# Patient Record
Sex: MALE | Race: WHITE | Employment: FULL TIME | URBAN - METROPOLITAN AREA
[De-identification: names, ages, dates, MRNs, and addresses within clinical notes are randomized per-mention and may not be internally consistent; named-entity substitution may affect disease eponyms.]

---

## 2017-03-15 ENCOUNTER — OFFICE VISIT (OUTPATIENT)
Dept: RHEUMATOLOGY | Age: 37
End: 2017-03-15

## 2017-03-15 VITALS
RESPIRATION RATE: 16 BRPM | DIASTOLIC BLOOD PRESSURE: 79 MMHG | SYSTOLIC BLOOD PRESSURE: 132 MMHG | HEIGHT: 69 IN | OXYGEN SATURATION: 97 % | HEART RATE: 102 BPM | TEMPERATURE: 97.4 F | WEIGHT: 315 LBS | BODY MASS INDEX: 46.65 KG/M2

## 2017-03-15 DIAGNOSIS — Z79.60 LONG-TERM USE OF IMMUNOSUPPRESSANT MEDICATION: ICD-10-CM

## 2017-03-15 DIAGNOSIS — M45.9 ANKYLOSING SPONDYLITIS (HCC): Primary | ICD-10-CM

## 2017-03-15 DIAGNOSIS — E66.9 OBESITY, UNSPECIFIED OBESITY SEVERITY, UNSPECIFIED OBESITY TYPE: ICD-10-CM

## 2017-03-15 DIAGNOSIS — I10 ESSENTIAL HYPERTENSION: ICD-10-CM

## 2017-03-15 DIAGNOSIS — K76.0 HEPATIC STEATOSIS: ICD-10-CM

## 2017-03-15 NOTE — MR AVS SNAPSHOT
Visit Information Date & Time Provider Department Dept. Phone Encounter #  
 3/15/2017 11:30 AM Jigna Rodriguez MD [facility] [id number] Follow-up Instructions Return in about 4 months (around 7/15/2017). Upcoming Health Maintenance Date Due DTaP/Tdap/Td series (1 - Tdap) 5/19/2000 Allergies as of 3/15/2017  Review Complete On: 3/15/2017 By: Jigna Rodriguez MD  
  
 Severity Noted Reaction Type Reactions Hydrocodone  01/16/2015    Other (comments) Palpitations (after beginning therapy with lisinopril/HCTZ) Current Immunizations  Reviewed on 3/15/2017 Name Date Influenza Vaccine (Quad) PF 10/28/2016 Influenza Vaccine PF 10/2/2013 PPD 5/25/2006 Pneumococcal Polysaccharide (PPSV-23) 10/19/2015 Reviewed by Erika Watts LPN on 9/43/6272 at 57:15 AM  
You Were Diagnosed With   
  
 Codes Comments [facility]    -  Primary ICD-10-CM: M45.9 ICD-9-CM: 720.0 Hepatic steatosis     ICD-10-CM: K76.0 ICD-9-CM: 571.8 Essential hypertension     ICD-10-CM: I10 
ICD-9-CM: 401.9 Obesity, unspecified obesity severity, unspecified obesity type     ICD-10-CM: E66.9 ICD-9-CM: 278.00 Long-term use of immunosuppressant medication     ICD-10-CM: Z79.899 ICD-9-CM: V58.69 Vitals BP Pulse Temp Resp Height(growth percentile) Weight(growth percentile) 132/79 (BP 1 Location: Right arm, BP Patient Position: Sitting) (!) 102 97.4 °F (36.3 °C) (Oral) 16 5' 9\" (1.753 m) (!) 389 lb (176.4 kg) SpO2 BMI Smoking Status 97% 57.45 kg/m2 Former Smoker BMI and BSA Data Body Mass Index Body Surface Area  
 57.45 kg/m 2 2.93 m 2 Preferred Pharmacy Pharmacy Name Phone [address] [facility], [address] [facility] [phone] Your Updated Medication List  
  
   
 This list is accurate as of: 3/15/17 12:06 PM.  Always use your most recent med list.  
  
  
  
  
 ENBREL 50 mg/mL (0.98 mL) injection Generic drug:  etanercept INJECT THE CONTENTS OF ONE SYRINGE (50MG) SUBCUTANEOUSLY EVERY 7 DAYS. SINGLE-USE SYRINGE. REFRIGERATE. lisinopril-hydroCHLOROthiazide 10-12.5 mg per tablet Commonly known as:  PRINZIDE, ZESTORETIC  
TAKE 1 TABLET BY MOUTH EVERY DAY  
  
 metaxalone 800 mg tablet Commonly known as:  SKELAXIN Take 1 Tab by mouth two (2) times daily as needed for Pain (may make drowsy; no driving after taking; don't take with flexeril). multivitamin tablet Commonly known as:  ONE A DAY Take 1 Tab by mouth daily. traMADol 50 mg tablet Commonly known as:  ULTRAM  
TAKE 1 TABLET BY MOUTH EVERY 8 HOURS AS NEEDED FOR PAIN MAY CAUSE DROWSINESS We Performed the Following CBC WITH AUTOMATED DIFF [56481 CPT(R)] HEMOGLOBIN A1C W/O EAG [06248 CPT(R)] METABOLIC PANEL, COMPREHENSIVE [34566 CPT(R)] SED RATE (ESR) [id number] CPT(R)] Follow-up Instructions Return in about 4 months (around 7/15/2017). Patient Instructions Review BP and weight with Dr. Arellano [facility] today Introducing [facility]! Dear Will: 
Thank you for requesting a [employer] [facility] [employer] account. Our records indicate that you already have an active [employer] account. You can access your account anytime at [email or web address]. [email or web address] Did you know that you can access your hospital and ER discharge instructions at any time in [employer]? You can also review all of your test results from your hospital stay or ER visit. Additional Information If you have questions, please visit the Frequently Asked Questions section of the [employer] website at [email or web address] Remember, [employer] is NOT to be used for urgent needs. For medical emergencies, dial 911. Now available from your iPhone and Android! Please provide this summary of care documentation to your next provider. Your primary care clinician is listed as Kellie Soria. If you have any questions after today's visit, please call [phone].

## 2017-03-15 NOTE — PROGRESS NOTES
HISTORY OF PRESENT ILLNESS  Will Christen Bryan is a 39 y.o. male. HPI Mr. Deb Pemberton presents for follow up of ankylosing spondylitis. He is \"not terrible bad. \" Arthritis symptoms are stable, with pain in the neck, upper back, and shoulders. He has no numbness or tingling of the extremities. He has some diffuse swelling in the hands, ankles, and wrists in the mornings. AM stiffness is \"a few minutes tops. \" He has not had fevers or recent infections. The patient continues to take Enbrel 50 mg once weekly with good relief. He has been exercising 2-3 times per week, 15-20 minutes at a time (treadmill and light weights). He has taken tramadol infrequently for pain (at most 2 times per week). He checks his blood pressure every 2 weeks (145-150/ 90 range). He has tolerated hydrochlorothiazide/lisinopril once daily. Interim history reviewed  Current Outpatient Prescriptions   Medication Sig Dispense Refill    traMADol (ULTRAM) 50 mg tablet TAKE 1 TABLET BY MOUTH EVERY 8 HOURS AS NEEDED FOR PAIN MAY CAUSE DROWSINESS 30 Tab 0    ENBREL 50 mg/mL (0.98 mL) injection INJECT THE CONTENTS OF ONE SYRINGE (50MG) SUBCUTANEOUSLY EVERY 7 DAYS. SINGLE-USE SYRINGE. REFRIGERATE. 12 Syringe 1    lisinopril-hydroCHLOROthiazide (PRINZIDE, ZESTORETIC) 10-12.5 mg per tablet TAKE 1 TABLET BY MOUTH EVERY DAY 90 Tab 1    multivitamin (ONE A DAY) tablet Take 1 Tab by mouth daily.  metaxalone (SKELAXIN) 800 mg tablet Take 1 Tab by mouth two (2) times daily as needed for Pain (may make drowsy; no driving after taking; don't take with flexeril). 60 Tab 1     Allergies   Allergen Reactions    Hydrocodone Other (comments)     Palpitations (after beginning therapy with lisinopril/HCTZ)       Review of Systems   Constitutional: Negative for fever. Eyes: Negative for blurred vision. Respiratory: Negative for cough. Cardiovascular: Negative for leg swelling. Gastrointestinal: Negative for abdominal pain.        Physical Exam   Vitals reviewed. HEN/T: Oropharynx: normal; NO posterior pharyngeal exudate;    EYES: no conjunctival erythema    NECK: neck has decreased range of motion with extension, bilateral rotation, and flexion (held in fixed flexion); Minimal right lateral rotation possible. RESPIRATORY: lungs clear with BS=B/L;    CARDIOVASCULAR: regular rhythm; no murmurs; trace pedal edema;    GASTROINTESTINAL: no tenderness; no organomegaly    LYMPHATIC: no enlargement of cervical nodes;    MUSCULOSKELETAL: normal gait; decreased ROM with back extension and flexion (90 degrees); knees 90 degrees flexion. Hips with mildly reduced right IR. Full peripheral joint exam reveals no synovitis or joint tenderness. Occiput to wall 19 cm. Shober's 1 cm. No dorsal spine tenderness.    SKIN: no ulcerations or lesions. No rash   PSYCHIATRIC: mental status: alert and oriented x 3; good insight and judgement;     Lab Results   Component Value Date/Time    WBC 9.7 10/28/2016 03:25 PM    WBC 9.9 06/01/2012 03:59 PM    HGB 14.8 10/28/2016 03:25 PM    HCT 43.9 10/28/2016 03:25 PM    PLATELET 685 80/55/1413 03:25 PM    MCV 81 10/28/2016 03:25 PM     Lab Results   Component Value Date/Time    Sed rate (ESR) 15 10/28/2016 03:25 PM     Lab Results   Component Value Date/Time    Sodium 142 10/28/2016 03:25 PM    Potassium 4.4 10/28/2016 03:25 PM    Chloride 99 10/28/2016 03:25 PM    CO2 26 10/28/2016 03:25 PM    Anion gap 8 06/15/2010 03:16 PM    Glucose 89 10/28/2016 03:25 PM    BUN 14 10/28/2016 03:25 PM    Creatinine 0.89 10/28/2016 03:25 PM    BUN/Creatinine ratio 16 10/28/2016 03:25 PM    GFR est  10/28/2016 03:25 PM    GFR est non- 10/28/2016 03:25 PM    Calcium 9.7 10/28/2016 03:25 PM    Bilirubin, total 0.4 10/28/2016 03:25 PM    AST (SGOT) 41 10/28/2016 03:25 PM    Alk.  phosphatase 75 10/28/2016 03:25 PM    Protein, total 7.1 10/28/2016 03:25 PM    Albumin 4.4 10/28/2016 03:25 PM    Globulin 3.2 06/15/2010 03:16 PM    A-G Ratio 1.6 10/28/2016 03:25 PM    ALT (SGPT) 47 10/28/2016 03:25 PM     Lab Results   Component Value Date/Time    Cholesterol, total 159 10/28/2016 03:26 PM    HDL Cholesterol 39 10/28/2016 03:26 PM    LDL, calculated 92 10/28/2016 03:26 PM    VLDL, calculated 28 10/28/2016 03:26 PM    Triglyceride 140 10/28/2016 03:26 PM    CHOL/HDL Ratio 3.9 06/15/2010 03:16 PM     MHAQ 0.250    ASSESSMENT and PLAN    ICD-10-CM ICD-9-CM    1. Ankylosing spondylitis ([facility]): Overall improvement with Enbrel 50 mg weekly. History of uveitis (not active). Uses tramadol infrequently with benefit (at most 2 tablets per week). M45.9 720.0 SED RATE (ESR)  Enbrel 50 mg weekly  Tramadol 50 mg daily only if needed ( run and reviewed)  Comfortable, low impact exercise encouraged   2. Hepatic steatosis: frequently elevated liver enzymes and steatosis suggested on ultrasound  I67.7 049.4 METABOLIC PANEL, COMPREHENSIVE  Weight los encouraged  Monitor with PCP   3. Essential hypertension: normal today on lisinopril/HCTZ 10/12.5 mg daily U12 839.4 METABOLIC PANEL, COMPREHENSIVE  -continue current medication  -review and monitor with PCP  -weight loss encouraged   4. Obesity, unspecified obesity severity, unspecified obesity type E66.9 278.00 HEMOGLOBIN A1C W/O EAG  Reviewed importance of weight loss and encouraged him to discuss with PCP to begin a plan to assist with this   5.  Long-term use of immunosuppressant medication L92.004 H02.70 METABOLIC PANEL, COMPREHENSIVE      CBC WITH AUTOMATED DIFF

## 2017-03-16 LAB
ALBUMIN SERPL-MCNC: 3.9 G/DL (ref 3.5–5.5)
ALBUMIN/GLOB SERPL: 1.3 {RATIO} (ref 1.2–2.2)
ALP SERPL-CCNC: 66 IU/L (ref 39–117)
ALT SERPL-CCNC: 62 IU/L (ref 0–44)
AST SERPL-CCNC: 48 IU/L (ref 0–40)
BASOPHILS # BLD AUTO: 0.1 X10E3/UL (ref 0–0.2)
BASOPHILS NFR BLD AUTO: 1 %
BILIRUB SERPL-MCNC: 0.3 MG/DL (ref 0–1.2)
BUN SERPL-MCNC: 8 MG/DL (ref 6–20)
BUN/CREAT SERPL: 10 (ref 8–19)
CALCIUM SERPL-MCNC: 9.8 MG/DL (ref 8.7–10.2)
CHLORIDE SERPL-SCNC: 100 MMOL/L (ref 96–106)
CO2 SERPL-SCNC: 25 MMOL/L (ref 18–29)
CREAT SERPL-MCNC: 0.8 MG/DL (ref 0.76–1.27)
EOSINOPHIL # BLD AUTO: 0.2 X10E3/UL (ref 0–0.4)
EOSINOPHIL NFR BLD AUTO: 2 %
ERYTHROCYTE [DISTWIDTH] IN BLOOD BY AUTOMATED COUNT: 14.8 % (ref 12.3–15.4)
ERYTHROCYTE [SEDIMENTATION RATE] IN BLOOD BY WESTERGREN METHOD: 13 MM/HR (ref 0–15)
GLOBULIN SER CALC-MCNC: 2.9 G/DL (ref 1.5–4.5)
GLUCOSE SERPL-MCNC: 66 MG/DL (ref 65–99)
HBA1C MFR BLD: 5.7 % (ref 4.8–5.6)
HCT VFR BLD AUTO: 43.3 % (ref 37.5–51)
HGB BLD-MCNC: 14.6 G/DL (ref 12.6–17.7)
IMM GRANULOCYTES # BLD: 0 X10E3/UL (ref 0–0.1)
IMM GRANULOCYTES NFR BLD: 0 %
LYMPHOCYTES # BLD AUTO: 2.8 X10E3/UL (ref 0.7–3.1)
LYMPHOCYTES NFR BLD AUTO: 32 %
MCH RBC QN AUTO: 27.8 PG (ref 26.6–33)
MCHC RBC AUTO-ENTMCNC: 33.7 G/DL (ref 31.5–35.7)
MCV RBC AUTO: 83 FL (ref 79–97)
MONOCYTES # BLD AUTO: 0.9 X10E3/UL (ref 0.1–0.9)
MONOCYTES NFR BLD AUTO: 10 %
NEUTROPHILS # BLD AUTO: 4.7 X10E3/UL (ref 1.4–7)
NEUTROPHILS NFR BLD AUTO: 55 %
PLATELET # BLD AUTO: 260 X10E3/UL (ref 150–379)
POTASSIUM SERPL-SCNC: 4.4 MMOL/L (ref 3.5–5.2)
PROT SERPL-MCNC: 6.8 G/DL (ref 6–8.5)
RBC # BLD AUTO: 5.25 X10E6/UL (ref 4.14–5.8)
SODIUM SERPL-SCNC: 140 MMOL/L (ref 134–144)
WBC # BLD AUTO: 8.7 X10E3/UL (ref 3.4–10.8)

## 2017-03-17 NOTE — PROGRESS NOTES
Left message for patient to return phone call. Reason for call:  Results/instructions:  Labs Forwarded to PCP.

## 2017-03-17 NOTE — PROGRESS NOTES
Called and spoke with the patient. Informed them of the results/orders. Understanding was verbalized and they agreed to follow Dr. Kay Latham orders.

## 2017-03-17 NOTE — PROGRESS NOTES
Liver tests remain mildly elevated. A1C very slightly increased, in the pre-diabetes range. Please forward to PCP and have patient review with PCP soon.

## 2017-05-31 ENCOUNTER — DOCUMENTATION ONLY (OUTPATIENT)
Dept: RHEUMATOLOGY | Age: 37
End: 2017-05-31

## 2017-07-12 RX ORDER — LISINOPRIL AND HYDROCHLOROTHIAZIDE 10; 12.5 MG/1; MG/1
TABLET ORAL
Qty: 90 TAB | Refills: 0 | Status: SHIPPED | OUTPATIENT
Start: 2017-07-12 | End: 2017-07-27 | Stop reason: SDUPTHER

## 2017-07-27 RX ORDER — LISINOPRIL AND HYDROCHLOROTHIAZIDE 10; 12.5 MG/1; MG/1
TABLET ORAL
Qty: 90 TAB | Refills: 0 | Status: SHIPPED | OUTPATIENT
Start: 2017-07-27 | End: 2017-10-12 | Stop reason: SDUPTHER

## 2017-10-12 ENCOUNTER — TELEPHONE (OUTPATIENT)
Dept: RHEUMATOLOGY | Age: 37
End: 2017-10-12

## 2017-10-12 NOTE — TELEPHONE ENCOUNTER
----- Message from Gracy Srinivasan MD sent at 10/12/2017  7:50 AM EDT -----  I filled BP med. I turned down tramadol. Does he have an acute pain flare? He needs a visit for follow up soon. Not seen since March and last visit canceled. If acute pain, please notify me.

## 2017-10-12 NOTE — TELEPHONE ENCOUNTER
Patient given instructions per Dr. [place] note. Patient understood. Patient states he is not having a flare, but is getting low on his Tramadol. Patient instructed to make an appointment. Patient informed of new Narcotic agreement, and patient states he may some other pain medication. Patient informed he may need to see a pain doctor. Patient states he will call if he has a flare.